# Patient Record
Sex: MALE | Race: BLACK OR AFRICAN AMERICAN | URBAN - METROPOLITAN AREA
[De-identification: names, ages, dates, MRNs, and addresses within clinical notes are randomized per-mention and may not be internally consistent; named-entity substitution may affect disease eponyms.]

---

## 2022-09-09 ENCOUNTER — OFFICE VISIT (OUTPATIENT)
Dept: PRIMARY CARE CLINIC | Age: 22
End: 2022-09-09

## 2022-09-09 VITALS
DIASTOLIC BLOOD PRESSURE: 70 MMHG | BODY MASS INDEX: 20.41 KG/M2 | HEART RATE: 101 BPM | SYSTOLIC BLOOD PRESSURE: 116 MMHG | HEIGHT: 66 IN | WEIGHT: 127 LBS

## 2022-09-09 DIAGNOSIS — R63.0 DECREASED APPETITE: ICD-10-CM

## 2022-09-09 DIAGNOSIS — Z00.00 HEALTHCARE MAINTENANCE: ICD-10-CM

## 2022-09-09 DIAGNOSIS — Z13.31 POSITIVE DEPRESSION SCREENING: ICD-10-CM

## 2022-09-09 DIAGNOSIS — G47.00 INSOMNIA, UNSPECIFIED TYPE: ICD-10-CM

## 2022-09-09 DIAGNOSIS — F41.9 ANXIETY: ICD-10-CM

## 2022-09-09 DIAGNOSIS — Z00.00 PHYSICAL EXAM: ICD-10-CM

## 2022-09-09 DIAGNOSIS — Z00.00 PHYSICAL EXAM: Primary | ICD-10-CM

## 2022-09-09 DIAGNOSIS — F12.90 MARIJUANA USER: ICD-10-CM

## 2022-09-09 PROCEDURE — 99395 PREV VISIT EST AGE 18-39: CPT | Performed by: FAMILY MEDICINE

## 2022-09-09 ASSESSMENT — ANXIETY QUESTIONNAIRES
3. WORRYING TOO MUCH ABOUT DIFFERENT THINGS: 1
1. FEELING NERVOUS, ANXIOUS, OR ON EDGE: 1
GAD7 TOTAL SCORE: 7
5. BEING SO RESTLESS THAT IT IS HARD TO SIT STILL: 1
2. NOT BEING ABLE TO STOP OR CONTROL WORRYING: 1
4. TROUBLE RELAXING: 1
7. FEELING AFRAID AS IF SOMETHING AWFUL MIGHT HAPPEN: 1
6. BECOMING EASILY ANNOYED OR IRRITABLE: 1

## 2022-09-09 ASSESSMENT — PATIENT HEALTH QUESTIONNAIRE - PHQ9
1. LITTLE INTEREST OR PLEASURE IN DOING THINGS: 3
7. TROUBLE CONCENTRATING ON THINGS, SUCH AS READING THE NEWSPAPER OR WATCHING TELEVISION: 3
4. FEELING TIRED OR HAVING LITTLE ENERGY: 2
SUM OF ALL RESPONSES TO PHQ QUESTIONS 1-9: 14
5. POOR APPETITE OR OVEREATING: 2
8. MOVING OR SPEAKING SO SLOWLY THAT OTHER PEOPLE COULD HAVE NOTICED. OR THE OPPOSITE, BEING SO FIGETY OR RESTLESS THAT YOU HAVE BEEN MOVING AROUND A LOT MORE THAN USUAL: 0
6. FEELING BAD ABOUT YOURSELF - OR THAT YOU ARE A FAILURE OR HAVE LET YOURSELF OR YOUR FAMILY DOWN: 1
SUM OF ALL RESPONSES TO PHQ QUESTIONS 1-9: 14
SUM OF ALL RESPONSES TO PHQ QUESTIONS 1-9: 14
2. FEELING DOWN, DEPRESSED OR HOPELESS: 0
9. THOUGHTS THAT YOU WOULD BE BETTER OFF DEAD, OR OF HURTING YOURSELF: 0
SUM OF ALL RESPONSES TO PHQ QUESTIONS 1-9: 14
SUM OF ALL RESPONSES TO PHQ9 QUESTIONS 1 & 2: 3
3. TROUBLE FALLING OR STAYING ASLEEP: 3

## 2022-09-09 ASSESSMENT — ENCOUNTER SYMPTOMS
SHORTNESS OF BREATH: 0
ABDOMINAL PAIN: 0
WHEEZING: 0
EYE REDNESS: 0
COUGH: 0
DIARRHEA: 0
CHEST TIGHTNESS: 0

## 2022-09-09 NOTE — PROGRESS NOTES
Chief Complaint   Patient presents with    Establish Care         ASSESSMENT/PLAN:  1. Physical exam  VS reviewed     BMI reviewed   All questions answered. F/u discussed. Appropriate healthy lifestyle modifications discussed. - CBC with Auto Differential; Future  - Comprehensive Metabolic Panel; Future    2. Healthcare maintenance  Update  - HIV Screen; Future  - Hepatitis C Antibody; Future    3. Anxiety  Seems to be anxious regarding the health of his father, his living situation and being 25 not having a job or any money and no car  Would like to talk to somebody  Rule out thyroid abnormality  - Ambulatory referral to Psychology  - TSH with Reflex; Future    4. Positive depression screening  PHQ-9 score today: (PHQ-9 Total Score: 14), additional evaluation and assessment performed, follow-up plan includes but not limited to: Medication management and Referral to /Specialist  for evaluation and management. 5. Marijuana user  Daily, helps with his anxiety but seems to also keep him awake at night and cause some paranoia  States that this is helped him get through [facility] not trying things and stay levelheaded  The room smells very strongly of marijuana    6. Decreased appetite  Rule out thyroid abnormality  Patient does not have any money and states that sometimes he just does not eat  - TSH with Reflex; Future    7. Insomnia, unspecified type  Difficult for him to fall asleep due to anxiety and his mind will not shut down  He will try to smoke pot to help sleep but sometimes does not work  Rule out thyroid abnormality  - TSH with Reflex; Future     I have spent 20 minutes of face to face time for education and counseling with the patient, as well as chart review, for insomnia, marijuana use and risk vs benefit, anxiety and stress. HPI:  Sanjuanita Billy is a 25 y.o. (: 2000) here today for physical exam.     Moved form [facility]. Living with cousin. No transport or job. No licence or ID. He is difficult to follow but alert and oriented    Has difficulty sleeping  Has difficulty maintaining appetite due to stress he states  Is very anxious about his father's health who is in the hospital and living with his cousin without a job. He does not have a car or any independence  Spent all of his money on a bus ticket to [facility] from [facility] [address]. Would like to update blood work    Review of Systems   Constitutional:  Positive for fatigue. Negative for activity change, chills and fever. HENT:  Negative for congestion. Eyes:  Negative for redness. Respiratory:  Negative for cough, chest tightness, shortness of breath and wheezing. Cardiovascular:  Negative for chest pain and palpitations. Gastrointestinal:  Negative for abdominal pain and diarrhea. Genitourinary:  Negative for difficulty urinating. Musculoskeletal:  Negative for arthralgias. Skin:  Negative for rash. Allergic/Immunologic: Negative for immunocompromised state. Neurological:  Negative for dizziness, light-headedness and headaches. Hematological:  Negative for adenopathy. Psychiatric/Behavioral:  Positive for agitation, behavioral problems, decreased concentration and sleep disturbance. Negative for self-injury and suicidal ideas. The patient is nervous/anxious. Past Medical History:   Diagnosis Date    ADHD        History reviewed. No pertinent family history. Social History     Tobacco Use    Smoking status: Every Day     Types: Cigarettes, Cigars    Smokeless tobacco: Never   Substance Use Topics    Alcohol use: Yes    Drug use: Yes     Frequency: 7.0 times per week     Types: Marijuana Delona Members)       New Prescriptions    No medications on file       Meds Prior to visit:  No current outpatient medications on file prior to visit. No current facility-administered medications on file prior to visit.      No Known Allergies    OBJECTIVE:  /70   Pulse (!) 101   Ht 5' 6\" (1.676 m)   Wt 127 lb (57.6 kg)   BMI 20.50 kg/m²   BP Readings from Last 2 Encounters:   09/09/22 116/70     Wt Readings from Last 3 Encounters:   09/09/22 127 lb (57.6 kg)       Physical Exam  Vitals reviewed. Constitutional:       General: He is not in acute distress. Appearance: Normal appearance. He is well-developed. He is not ill-appearing. HENT:      Head: Normocephalic and atraumatic. Right Ear: Tympanic membrane, ear canal and external ear normal. There is no impacted cerumen. Left Ear: Ear canal and external ear normal. There is no impacted cerumen. Nose: Nose normal. No congestion or rhinorrhea. Mouth/Throat:      Mouth: Mucous membranes are moist.      Pharynx: Oropharynx is clear. No oropharyngeal exudate or posterior oropharyngeal erythema. Eyes:      General: No scleral icterus. Right eye: No discharge. Left eye: No discharge. Extraocular Movements: Extraocular movements intact. Conjunctiva/sclera: Conjunctivae normal.      Pupils: Pupils are equal, round, and reactive to light. Cardiovascular:      Rate and Rhythm: Normal rate and regular rhythm. Pulses: Normal pulses. Heart sounds: Normal heart sounds. No murmur heard. Comments: Normal radial and pedal pulses  Pulmonary:      Effort: Pulmonary effort is normal. No respiratory distress. Breath sounds: Normal breath sounds. No wheezing. Chest:      Chest wall: No tenderness. Abdominal:      General: Bowel sounds are normal. There is no distension. Palpations: Abdomen is soft. There is no mass. Tenderness: There is no abdominal tenderness. Comments: Normal liver and spleen, no organomegaly. Musculoskeletal:         General: No tenderness or deformity. Normal range of motion. Cervical back: Normal range of motion and neck supple. Right lower leg: No edema. Left lower leg: No edema.       Comments: Range of motion intact in all extremities   Lymphadenopathy: Cervical: No cervical adenopathy. Skin:     General: Skin is warm and dry. Capillary Refill: Capillary refill takes less than 2 seconds. Findings: No erythema or rash. Neurological:      Mental Status: He is alert and oriented to person, place, and time. Sensory: No sensory deficit. Motor: No abnormal muscle tone. Coordination: Coordination normal.   Psychiatric:         Behavior: Behavior normal.         Thought Content: Thought content normal.         Judgment: Judgment normal.      Comments:           Discussed use, benefit, and side effects of prescribed medications. Barriers to medication compliance addressed. All patient questions answered. Pt voiced understanding. RTC No follow-ups on file. No future appointments.       Darin Isaac MD  9/9/2022  11:21 AM

## 2022-09-10 LAB
A/G RATIO: 2.2 (ref 1.1–2.2)
ALBUMIN SERPL-MCNC: 4.6 G/DL (ref 3.4–5)
ALP BLD-CCNC: 62 U/L (ref 40–129)
ALT SERPL-CCNC: 14 U/L (ref 10–40)
ANION GAP SERPL CALCULATED.3IONS-SCNC: 12 MMOL/L (ref 3–16)
AST SERPL-CCNC: 15 U/L (ref 15–37)
BASOPHILS ABSOLUTE: 0 K/UL (ref 0–0.2)
BASOPHILS RELATIVE PERCENT: 0.4 %
BILIRUB SERPL-MCNC: 0.3 MG/DL (ref 0–1)
BUN BLDV-MCNC: 21 MG/DL (ref 7–20)
CALCIUM SERPL-MCNC: 9.1 MG/DL (ref 8.3–10.6)
CHLORIDE BLD-SCNC: 103 MMOL/L (ref 99–110)
CO2: 23 MMOL/L (ref 21–32)
CREAT SERPL-MCNC: 0.8 MG/DL (ref 0.9–1.3)
EOSINOPHILS ABSOLUTE: 0.1 K/UL (ref 0–0.6)
EOSINOPHILS RELATIVE PERCENT: 0.7 %
GFR AFRICAN AMERICAN: >60
GFR NON-AFRICAN AMERICAN: >60
GLUCOSE BLD-MCNC: 108 MG/DL (ref 70–99)
HCT VFR BLD CALC: 43.1 % (ref 40.5–52.5)
HEMOGLOBIN: 14.6 G/DL (ref 13.5–17.5)
HEPATITIS C ANTIBODY INTERPRETATION: NORMAL
HIV AG/AB: NORMAL
HIV ANTIGEN: NORMAL
HIV-1 ANTIBODY: NORMAL
HIV-2 AB: NORMAL
LYMPHOCYTES ABSOLUTE: 1.7 K/UL (ref 1–5.1)
LYMPHOCYTES RELATIVE PERCENT: 22.2 %
MCH RBC QN AUTO: 32 PG (ref 26–34)
MCHC RBC AUTO-ENTMCNC: 34 G/DL (ref 31–36)
MCV RBC AUTO: 94.2 FL (ref 80–100)
MONOCYTES ABSOLUTE: 0.5 K/UL (ref 0–1.3)
MONOCYTES RELATIVE PERCENT: 5.8 %
NEUTROPHILS ABSOLUTE: 5.6 K/UL (ref 1.7–7.7)
NEUTROPHILS RELATIVE PERCENT: 70.9 %
PDW BLD-RTO: 13.6 % (ref 12.4–15.4)
PLATELET # BLD: 167 K/UL (ref 135–450)
PMV BLD AUTO: 8.5 FL (ref 5–10.5)
POTASSIUM SERPL-SCNC: 4.5 MMOL/L (ref 3.5–5.1)
RBC # BLD: 4.58 M/UL (ref 4.2–5.9)
SODIUM BLD-SCNC: 138 MMOL/L (ref 136–145)
TOTAL PROTEIN: 6.7 G/DL (ref 6.4–8.2)
TSH REFLEX: 0.28 UIU/ML (ref 0.27–4.2)
WBC # BLD: 7.9 K/UL (ref 4–11)

## 2022-11-29 ENCOUNTER — HOSPITAL ENCOUNTER (EMERGENCY)
Age: 22
Discharge: HOME | End: 2022-11-29
Attending: EMERGENCY MEDICINE

## 2022-11-29 ENCOUNTER — APPOINTMENT (OUTPATIENT)
Dept: GENERAL RADIOLOGY | Age: 22
End: 2022-11-29

## 2022-11-29 ENCOUNTER — APPOINTMENT (OUTPATIENT)
Dept: CT IMAGING | Age: 22
End: 2022-11-29

## 2022-11-29 VITALS
TEMPERATURE: 97.7 F | OXYGEN SATURATION: 99 % | RESPIRATION RATE: 16 BRPM | DIASTOLIC BLOOD PRESSURE: 75 MMHG | HEART RATE: 86 BPM | WEIGHT: 125 LBS | HEIGHT: 66 IN | SYSTOLIC BLOOD PRESSURE: 121 MMHG | BODY MASS INDEX: 20.09 KG/M2

## 2022-11-29 DIAGNOSIS — J10.1 INFLUENZA A: ICD-10-CM

## 2022-11-29 DIAGNOSIS — J18.9 ATYPICAL PNEUMONIA: ICD-10-CM

## 2022-11-29 LAB
A/G RATIO: 1.4 (ref 1.1–2.2)
ALBUMIN SERPL-MCNC: 4.2 G/DL (ref 3.4–5)
ALP BLD-CCNC: 60 U/L (ref 40–129)
ALT SERPL-CCNC: 21 U/L (ref 10–40)
ANION GAP SERPL CALCULATED.3IONS-SCNC: 12 MMOL/L (ref 3–16)
AST SERPL-CCNC: 25 U/L (ref 15–37)
BASOPHILS ABSOLUTE: 0 K/UL (ref 0–0.2)
BASOPHILS RELATIVE PERCENT: 0.5 %
BILIRUB SERPL-MCNC: 0.3 MG/DL (ref 0–1)
BILIRUBIN URINE: NEGATIVE
BLOOD, URINE: NEGATIVE
BUN BLDV-MCNC: 14 MG/DL (ref 7–20)
CALCIUM SERPL-MCNC: 9 MG/DL (ref 8.3–10.6)
CHLORIDE BLD-SCNC: 97 MMOL/L (ref 99–110)
CLARITY: CLEAR
CO2: 22 MMOL/L (ref 21–32)
COLOR: YELLOW
CREAT SERPL-MCNC: 0.6 MG/DL (ref 0.9–1.3)
EKG ATRIAL RATE: 78 BPM
EKG DIAGNOSIS: NORMAL
EKG P AXIS: 39 DEGREES
EKG P-R INTERVAL: 132 MS
EKG Q-T INTERVAL: 374 MS
EKG QRS DURATION: 80 MS
EKG QTC CALCULATION (BAZETT): 426 MS
EKG R AXIS: -77 DEGREES
EKG T AXIS: 2 DEGREES
EKG VENTRICULAR RATE: 78 BPM
EOSINOPHILS ABSOLUTE: 0 K/UL (ref 0–0.6)
EOSINOPHILS RELATIVE PERCENT: 0 %
EPITHELIAL CELLS, UA: ABNORMAL /HPF (ref 0–5)
GFR SERPL CREATININE-BSD FRML MDRD: >60 ML/MIN/{1.73_M2}
GLUCOSE BLD-MCNC: 107 MG/DL (ref 70–99)
GLUCOSE URINE: NEGATIVE MG/DL
HCT VFR BLD CALC: 44.3 % (ref 40.5–52.5)
HEMOGLOBIN: 15.3 G/DL (ref 13.5–17.5)
KETONES, URINE: NEGATIVE MG/DL
LEUKOCYTE ESTERASE, URINE: NEGATIVE
LIPASE: 16 U/L (ref 13–60)
LYMPHOCYTES ABSOLUTE: 1.1 K/UL (ref 1–5.1)
LYMPHOCYTES RELATIVE PERCENT: 14.4 %
MCH RBC QN AUTO: 31.5 PG (ref 26–34)
MCHC RBC AUTO-ENTMCNC: 34.4 G/DL (ref 31–36)
MCV RBC AUTO: 91.4 FL (ref 80–100)
MICROSCOPIC EXAMINATION: YES
MONOCYTES ABSOLUTE: 0.8 K/UL (ref 0–1.3)
MONOCYTES RELATIVE PERCENT: 10.5 %
MUCUS: ABNORMAL /LPF
NEUTROPHILS ABSOLUTE: 5.5 K/UL (ref 1.7–7.7)
NEUTROPHILS RELATIVE PERCENT: 74.6 %
NITRITE, URINE: NEGATIVE
PDW BLD-RTO: 14 % (ref 12.4–15.4)
PH UA: 7 (ref 5–8)
PLATELET # BLD: 139 K/UL (ref 135–450)
PMV BLD AUTO: 8.5 FL (ref 5–10.5)
POTASSIUM REFLEX MAGNESIUM: 3.8 MMOL/L (ref 3.5–5.1)
PRO-BNP: 18 PG/ML (ref 0–124)
PROTEIN UA: ABNORMAL MG/DL
RAPID INFLUENZA  B AGN: NEGATIVE
RAPID INFLUENZA A AGN: POSITIVE
RBC # BLD: 4.85 M/UL (ref 4.2–5.9)
RBC UA: ABNORMAL /HPF (ref 0–4)
SARS-COV-2, NAAT: NOT DETECTED
SODIUM BLD-SCNC: 131 MMOL/L (ref 136–145)
SPECIFIC GRAVITY UA: <=1.005 (ref 1–1.03)
TOTAL PROTEIN: 7.1 G/DL (ref 6.4–8.2)
TROPONIN: <0.01 NG/ML
URINE REFLEX TO CULTURE: ABNORMAL
URINE TYPE: ABNORMAL
UROBILINOGEN, URINE: 0.2 E.U./DL
WBC # BLD: 7.4 K/UL (ref 4–11)
WBC UA: ABNORMAL /HPF (ref 0–5)

## 2022-11-29 PROCEDURE — 87804 INFLUENZA ASSAY W/OPTIC: CPT

## 2022-11-29 PROCEDURE — 83690 ASSAY OF LIPASE: CPT

## 2022-11-29 PROCEDURE — 85025 COMPLETE CBC W/AUTO DIFF WBC: CPT

## 2022-11-29 PROCEDURE — 81001 URINALYSIS AUTO W/SCOPE: CPT

## 2022-11-29 PROCEDURE — 87635 SARS-COV-2 COVID-19 AMP PRB: CPT

## 2022-11-29 PROCEDURE — 74177 CT ABD & PELVIS W/CONTRAST: CPT

## 2022-11-29 PROCEDURE — 71045 X-RAY EXAM CHEST 1 VIEW: CPT

## 2022-11-29 PROCEDURE — 6360000002 HC RX W HCPCS: Performed by: EMERGENCY MEDICINE

## 2022-11-29 PROCEDURE — 99285 EMERGENCY DEPT VISIT HI MDM: CPT

## 2022-11-29 PROCEDURE — 93005 ELECTROCARDIOGRAM TRACING: CPT | Performed by: EMERGENCY MEDICINE

## 2022-11-29 PROCEDURE — 84484 ASSAY OF TROPONIN QUANT: CPT

## 2022-11-29 PROCEDURE — 6360000004 HC RX CONTRAST MEDICATION: Performed by: EMERGENCY MEDICINE

## 2022-11-29 PROCEDURE — 2580000003 HC RX 258: Performed by: EMERGENCY MEDICINE

## 2022-11-29 PROCEDURE — 80053 COMPREHEN METABOLIC PANEL: CPT

## 2022-11-29 PROCEDURE — 83880 ASSAY OF NATRIURETIC PEPTIDE: CPT

## 2022-11-29 RX ORDER — 0.9 % SODIUM CHLORIDE 0.9 %
1000 INTRAVENOUS SOLUTION INTRAVENOUS ONCE
Status: COMPLETED | OUTPATIENT
Start: 2022-11-29 | End: 2022-11-29

## 2022-11-29 RX ORDER — KETOROLAC TROMETHAMINE 30 MG/ML
30 INJECTION, SOLUTION INTRAMUSCULAR; INTRAVENOUS ONCE
Status: COMPLETED | OUTPATIENT
Start: 2022-11-29 | End: 2022-11-29

## 2022-11-29 RX ORDER — ONDANSETRON 2 MG/ML
4 INJECTION INTRAMUSCULAR; INTRAVENOUS
Status: DISCONTINUED | OUTPATIENT
Start: 2022-11-29 | End: 2022-11-29 | Stop reason: HOSPADM

## 2022-11-29 RX ORDER — BENZONATATE 100 MG/1
100 CAPSULE ORAL 2 TIMES DAILY PRN
Qty: 20 CAPSULE | Refills: 0 | Status: SHIPPED | OUTPATIENT
Start: 2022-11-29 | End: 2022-12-06

## 2022-11-29 RX ORDER — AZITHROMYCIN 250 MG/1
TABLET, FILM COATED ORAL
Qty: 1 PACKET | Refills: 0 | Status: SHIPPED | OUTPATIENT
Start: 2022-11-29

## 2022-11-29 RX ORDER — DEXTROMETHORPHAN HYDROBROMIDE AND PROMETHAZINE HYDROCHLORIDE 15; 6.25 MG/5ML; MG/5ML
5 SYRUP ORAL 4 TIMES DAILY PRN
Qty: 100 ML | Refills: 0 | Status: SHIPPED | OUTPATIENT
Start: 2022-11-29 | End: 2022-12-06

## 2022-11-29 RX ORDER — IBUPROFEN 200 MG
600 TABLET ORAL EVERY 8 HOURS PRN
Qty: 60 TABLET | Refills: 0 | Status: SHIPPED | OUTPATIENT
Start: 2022-11-29

## 2022-11-29 RX ADMIN — KETOROLAC TROMETHAMINE 30 MG: 30 INJECTION, SOLUTION INTRAMUSCULAR at 11:36

## 2022-11-29 RX ADMIN — SODIUM CHLORIDE 1000 ML: 9 INJECTION, SOLUTION INTRAVENOUS at 11:36

## 2022-11-29 RX ADMIN — IOHEXOL 100 ML: 350 INJECTION, SOLUTION INTRAVENOUS at 12:51

## 2022-11-29 ASSESSMENT — PAIN SCALES - GENERAL: PAINLEVEL_OUTOF10: 8

## 2022-11-29 ASSESSMENT — PAIN - FUNCTIONAL ASSESSMENT: PAIN_FUNCTIONAL_ASSESSMENT: 0-10

## 2022-11-29 NOTE — ED PROVIDER NOTES
The [address] [place] [address] Emergency Department    Sandeep Rose MD, am the primary clinician of record. CHIEF COMPLAINT  Chief Complaint   Patient presents with    Influenza      HISTORY OF PRESENT ILLNESS  Lester Diaz is a 25 y.o. male  who presents to the ED complaining of a few days of cough/cold symptoms. Arrives by EMS and temp is 100.7 on arrival.  Feels restless because of coughing and body aches. Reports feeling generally weak. Reports diffuse upper abdominal pains as well. +nausea, no vomiting/diarrhea. No jaundice. No sick contacts he is aware of. Feels SOB. Describes some chest pains as wel. Onset of sx on Thursday (Thanksgiving). No other complaints, modifying factors or associated symptoms. I have reviewed the following from the nursing documentation. Past Medical History:   Diagnosis Date    ADHD      History reviewed. No pertinent surgical history. History reviewed. No pertinent family history.   Social History     Socioeconomic History    Marital status: Single     Spouse name: Not on file    Number of children: Not on file    Years of education: Not on file    Highest education level: Not on file   Occupational History    Not on file   Tobacco Use    Smoking status: Every Day     Packs/day: 0.50     Types: Cigarettes, Cigars    Smokeless tobacco: Never   Substance and Sexual Activity    Alcohol use: Yes     Comment: occasionally    Drug use: Yes     Frequency: 7.0 times per week     Types: Marijuana Seabron Barban)    Sexual activity: Not on file   Other Topics Concern    Not on file   Social History Narrative    Not on file     Social Determinants of Health     Financial Resource Strain: Not on file   Food Insecurity: Not on file   Transportation Needs: Not on file   Physical Activity: Not on file   Stress: Not on file   Social Connections: Not on file   Intimate Partner Violence: Not on file   Housing Stability: Not on file     Current Facility-Administered Medications Medication Dose Route Frequency Provider Last Rate Last Admin    ondansetron (ZOFRAN) injection 4 mg  4 mg IntraVENous Q1H PRN Gerhard Mejia MD         Current Outpatient Medications   Medication Sig Dispense Refill    azithromycin (ZITHROMAX) 250 MG tablet Take 500mg (two tablets) on the first day by mouth. Then take 250mg (one tablet) by mouth daily for 4 more days. Complete this medication regimen even if you feel better before it is done. 1 packet 0    benzonatate (TESSALON) 100 MG capsule Take 1 capsule by mouth 2 times daily as needed for Cough 20 capsule 0    promethazine-dextromethorphan (PROMETHAZINE-DM) 6.25-15 MG/5ML syrup Take 5 mLs by mouth 4 times daily as needed for Cough 100 mL 0    ibuprofen (ADVIL) 200 MG tablet Take 3 tablets by mouth every 8 hours as needed for Pain 60 tablet 0     No Known Allergies    REVIEW OF SYSTEMS  10 systems reviewed, pertinent positives per HPI otherwise noted to be negative. PHYSICAL EXAM  /75   Pulse 86   Temp 97.7 °F (36.5 °C) (Oral)   Resp 16   Ht 5' 6\" (1.676 m)   Wt 125 lb (56.7 kg)   SpO2 99%   BMI 20.18 kg/m²    GENERAL APPEARANCE: Awake and alert. Cooperative. No distress. HENT: Normocephalic. Atraumatic. Mucous membranes are dry. NECK: Supple. EYES: PERRL. EOM's grossly intact. HEART/CHEST: RRR. No murmurs. No chest wall tenderness. LUNGS: Respirations unlabored. CTAB. Good air exchange. Speaking comfortably in full sentences. ABDOMEN: Mild LUQ epig and RUQ ttp without peritonitis, perez's sign, or lower abd tenderness. Soft. Non-distended. No masses. No organomegaly. No guarding or rebound. Normal bowel sounds throughout. MUSCULOSKELETAL: No extremity edema. Compartments soft. No deformity. No tenderness in the extremities. All extremities neurovascularly intact. SKIN: Warm and dry. No acute rashes. NEUROLOGICAL: Alert and oriented. CN's 2-12 intact. No gross facial drooping. Strength 5/5, sensation intact.  2 plus DTR's in knees bilaterally. Gait normal.  PSYCHIATRIC: Normal mood and affect. LABS  I have reviewed all labs for this visit.    Results for orders placed or performed during the hospital encounter of 11/29/22   Rapid Flu Swab    Specimen: Nasopharyngeal   Result Value Ref Range    Rapid Influenza A Ag POSITIVE (A) Negative    Rapid Influenza B Ag Negative Negative   COVID-19, Rapid    Specimen: Nasopharyngeal Swab   Result Value Ref Range    SARS-CoV-2, NAAT Not Detected Not Detected   CBC with Auto Differential   Result Value Ref Range    WBC 7.4 4.0 - 11.0 K/uL    RBC 4.85 4.20 - 5.90 M/uL    Hemoglobin 15.3 13.5 - 17.5 g/dL    Hematocrit 44.3 40.5 - 52.5 %    MCV 91.4 80.0 - 100.0 fL    MCH 31.5 26.0 - 34.0 pg    MCHC 34.4 31.0 - 36.0 g/dL    RDW 14.0 12.4 - 15.4 %    Platelets 348 471 - 029 K/uL    MPV 8.5 5.0 - 10.5 fL    Neutrophils % 74.6 %    Lymphocytes % 14.4 %    Monocytes % 10.5 %    Eosinophils % 0.0 %    Basophils % 0.5 %    Neutrophils Absolute 5.5 1.7 - 7.7 K/uL    Lymphocytes Absolute 1.1 1.0 - 5.1 K/uL    Monocytes Absolute 0.8 0.0 - 1.3 K/uL    Eosinophils Absolute 0.0 0.0 - 0.6 K/uL    Basophils Absolute 0.0 0.0 - 0.2 K/uL   Comprehensive Metabolic Panel w/ Reflex to MG   Result Value Ref Range    Sodium 131 (L) 136 - 145 mmol/L    Potassium reflex Magnesium 3.8 3.5 - 5.1 mmol/L    Chloride 97 (L) 99 - 110 mmol/L    CO2 22 21 - 32 mmol/L    Anion Gap 12 3 - 16    Glucose 107 (H) 70 - 99 mg/dL    BUN 14 7 - 20 mg/dL    Creatinine 0.6 (L) 0.9 - 1.3 mg/dL    Est, Glom Filt Rate >60 >60    Calcium 9.0 8.3 - 10.6 mg/dL    Total Protein 7.1 6.4 - 8.2 g/dL    Albumin 4.2 3.4 - 5.0 g/dL    Albumin/Globulin Ratio 1.4 1.1 - 2.2    Total Bilirubin 0.3 0.0 - 1.0 mg/dL    Alkaline Phosphatase 60 40 - 129 U/L    ALT 21 10 - 40 U/L    AST 25 15 - 37 U/L   Troponin   Result Value Ref Range    Troponin <0.01 <0.01 ng/mL   Brain Natriuretic Peptide   Result Value Ref Range    Pro-BNP 18 0 - 124 pg/mL   Lipase Result Value Ref Range    Lipase 16.0 13.0 - 60.0 U/L   EKG 12 Lead   Result Value Ref Range    Ventricular Rate 78 BPM    Atrial Rate 78 BPM    P-R Interval 132 ms    QRS Duration 80 ms    Q-T Interval 374 ms    QTc Calculation (Bazett) 426 ms    P Axis 39 degrees    R Axis -77 degrees    T Axis 2 degrees    Diagnosis       Sinus rhythm with occasional Premature ventricular complexesLeft axis deviationRSR' or QR pattern in V1 suggests right ventricular conduction delayPossible Inferior infarct , age undeterminedAbnormal ECG       The 12 lead EKG was interpreted by me independent of a cardiologist as follows:  Rate: normal with a rate of 78  Rhythm: sinus with PVC's  Axis: left deviation  Intervals: normal NH, narrow QRS, normal QTc, RSR' pattern  ST segments: no ST elevations or depressions  T waves: no abnormal inversions  Non-specific T wave changes: present  Prior EKG comparison: No prior is currently available for comparison      RADIOLOGY    CT ABDOMEN PELVIS W IV CONTRAST Additional Contrast? None    Result Date: 11/29/2022  CT ABDOMEN AND PELVIS WITH CONTRAST HISTORY: COMPARISON: None TECHNICAL FACTORS: Post contrast axial images were obtained through the abdomen and pelvis. 100 cc of Isovue 370 was administered. Underexposure controls were utilized during the CT examination to meet ALARA standards for radiation dose reduction. FINDINGS: Irregular density seen suggestive of focal airspace disease at the right lung base is seen. This is suggestive of atypical pneumonia. Evaluation of the abdomen and pelvis is very limited due to extreme possibility of intra-abdominal fat. Liver shows uniform attenuation with no focal mass lesion or biliary ductal dilation. Tura Kasey bladder shows no stones or significant wall thickening. Spleen is within normal limits in size. Pancreas shows no gross inflammatory process or fluid collection. Adrenal glands are within normal limits.  Kidneys enhance uniformly with no gross mass or hydronephrosis seen. No retroperitoneal adenopathy is seen. Bowel gas pattern is nonspecific with no free air seen. Appendix is not clearly seen but no gross pericecal inflammatory changes are seen. No significant osseous abnormality is identified. No free fluid is seen. No evidence of acute pelvic inflammatory process is seen. Focal area of airspace disease seen at right lung base suspicious for pneumonia. It evaluation of the abdomen and pelvis is very limited due to extreme possibility of intra-abdominal fat. There is otherwise no discrete evidence of acute abnormality identified. XR CHEST 1 VIEW    Result Date: 11/29/2022  History: Fever. Upper respiratory infection. PA view the chest. FINDINGS: Normal heart size. No effusion or consolidation. No acute osseous abnormality. Mediastinal contours are unremarkable. 1. No acute disease. ED COURSE/MDM  Patient seen and evaluated. Old records reviewed. Labs and imaging reviewed and results discussed with patient. The patient's ED workup was notable for viral syndrome symptoms, with URI symptoms and some abdominal discomfort as well. As such, chest x-ray was obtained which is clear and a CT abdomen and pelvis which was fairly unremarkable for intra-abdominal findings however did show a questionable pneumonia. This could be related to his influenza a positivity, but he is out of the influenza treatment window for Tamiflu and so will be managed conservatively. However given that focal infiltrate on CT abdomen and pelvis despite a clear chest x-ray, he will be covered for atypical pneumonia with a Z-Lucius. He is not septic or toxic appearing or hypoxic at all. He is ambulatory without distress at all. No leukocytosis. COVID-negative. Follow-up with PCP. Return to ED for any new or worsening symptoms in the interim. He defervesced and is feeling a lot better on reassessment.           During the patient's ED course, the patient was given: Medications   ondansetron (ZOFRAN) injection 4 mg (has no administration in time range)   0.9 % sodium chloride IV bolus 1,000 mL (0 mLs IntraVENous Stopped 11/29/22 1302)   ketorolac (TORADOL) injection 30 mg (30 mg IntraVENous Given 11/29/22 1136)   iohexol (OMNIPAQUE 350) solution 100 mL (100 mLs IntraVENous Given 11/29/22 1251)        CLINICAL IMPRESSION  1. Influenza A    2. Atypical pneumonia        Blood pressure 121/75, pulse 86, temperature 97.7 °F (36.5 °C), temperature source Oral, resp. rate 16, height 5' 6\" (1.676 m), weight 125 lb (56.7 kg), SpO2 99 %. Laxmi Hensley was discharged to home in stable condition. I have discussed the findings of today's workup with the patient and addressed the patient's questions and concerns. Important warning signs as well as new or worsening symptoms which would necessitate immediate return to the ED were discussed. The plan is to discharge from the ED at this time, and the patient is in stable condition. The patient acknowledged understanding is agreeable with this plan. Patient was given scripts for the following medications. I counseled patient how to take these medications. New Prescriptions    AZITHROMYCIN (ZITHROMAX) 250 MG TABLET    Take 500mg (two tablets) on the first day by mouth. Then take 250mg (one tablet) by mouth daily for 4 more days. Complete this medication regimen even if you feel better before it is done.     BENZONATATE (TESSALON) 100 MG CAPSULE    Take 1 capsule by mouth 2 times daily as needed for Cough    IBUPROFEN (ADVIL) 200 MG TABLET    Take 3 tablets by mouth every 8 hours as needed for Pain    PROMETHAZINE-DEXTROMETHORPHAN (PROMETHAZINE-DM) 6.25-15 MG/5ML SYRUP    Take 5 mLs by mouth 4 times daily as needed for Cough       Follow-up with:  Your PCP    Schedule an appointment as soon as possible for a visit in 1 week  For symptom re-evaluation    [address] Emergency Department  [address] [place] [address]  [phone]  Go to   If symptoms worsen    DISCLAIMER: This chart was created using Dragon dictation software. Efforts were made by me to ensure accuracy, however some errors may be present due to limitations of this technology and occasionally words are not transcribed correctly.         Bethany Lee MD  11/29/22 [id number]

## 2022-12-06 ENCOUNTER — HOSPITAL ENCOUNTER (EMERGENCY)
Age: 22
Discharge: HOME | End: 2022-12-06
Attending: EMERGENCY MEDICINE
Payer: MEDICAID

## 2022-12-06 VITALS
TEMPERATURE: 97 F | DIASTOLIC BLOOD PRESSURE: 81 MMHG | HEART RATE: 81 BPM | BODY MASS INDEX: 21.24 KG/M2 | SYSTOLIC BLOOD PRESSURE: 126 MMHG | OXYGEN SATURATION: 98 % | RESPIRATION RATE: 17 BRPM | WEIGHT: 131.61 LBS

## 2022-12-06 DIAGNOSIS — L03.113 CELLULITIS OF RIGHT FOREARM: Primary | ICD-10-CM

## 2022-12-06 DIAGNOSIS — G47.00 INSOMNIA, UNSPECIFIED TYPE: ICD-10-CM

## 2022-12-06 PROCEDURE — 99283 EMERGENCY DEPT VISIT LOW MDM: CPT

## 2022-12-06 PROCEDURE — 6370000000 HC RX 637 (ALT 250 FOR IP): Performed by: EMERGENCY MEDICINE

## 2022-12-06 RX ORDER — CEPHALEXIN 500 MG/1
500 CAPSULE ORAL 4 TIMES DAILY
Qty: 28 CAPSULE | Refills: 0 | Status: SHIPPED | OUTPATIENT
Start: 2022-12-06 | End: 2022-12-13

## 2022-12-06 RX ORDER — IBUPROFEN 600 MG/1
600 TABLET ORAL EVERY 6 HOURS PRN
Qty: 40 TABLET | Refills: 0 | Status: SHIPPED | OUTPATIENT
Start: 2022-12-06

## 2022-12-06 RX ORDER — HYDROXYZINE HYDROCHLORIDE 25 MG/1
25 TABLET, FILM COATED ORAL NIGHTLY PRN
Qty: 10 TABLET | Refills: 0 | Status: SHIPPED | OUTPATIENT
Start: 2022-12-06 | End: 2022-12-16

## 2022-12-06 RX ORDER — CEPHALEXIN 500 MG/1
500 CAPSULE ORAL ONCE
Status: COMPLETED | OUTPATIENT
Start: 2022-12-06 | End: 2022-12-06

## 2022-12-06 RX ADMIN — IBUPROFEN 600 MG: 200 TABLET, FILM COATED ORAL at 13:25

## 2022-12-06 RX ADMIN — CEPHALEXIN 500 MG: 500 CAPSULE ORAL at 13:25

## 2022-12-06 ASSESSMENT — PAIN DESCRIPTION - ORIENTATION
ORIENTATION: RIGHT
ORIENTATION: RIGHT

## 2022-12-06 ASSESSMENT — PAIN - FUNCTIONAL ASSESSMENT
PAIN_FUNCTIONAL_ASSESSMENT: 0-10
PAIN_FUNCTIONAL_ASSESSMENT: 0-10

## 2022-12-06 ASSESSMENT — PAIN SCALES - GENERAL
PAINLEVEL_OUTOF10: 8
PAINLEVEL_OUTOF10: 8
PAINLEVEL_OUTOF10: 7

## 2022-12-06 ASSESSMENT — PAIN DESCRIPTION - DESCRIPTORS: DESCRIPTORS: SHARP;BURNING

## 2022-12-06 ASSESSMENT — PAIN DESCRIPTION - LOCATION
LOCATION: ARM
LOCATION: ARM

## 2022-12-06 NOTE — ED NOTES
Patient complaining of increasing arm discomfort and weakness;     Dr. Tate Charles informed; no new orders at this time;      Frandy Dallas RN  12/06/22 4131

## 2022-12-06 NOTE — ED PROVIDER NOTES
CHIEF COMPLAINT  Arm Pain (Right AC, pt has had pain since receiving medication in IV 3 days ago, slight redness noted )      HISTORY OF PRESENT ILLNESS  Fern Navarro is a 25 y.o. male who  has a past medical history of ADHD. presents to the ED complaining of right arm pain near an IV insertion site that he had 3 days ago at [facility] [place] [facility]. Patient received Toradol via IV and states that after receiving the injection he had some pain in his forearm. States the pain is gotten worse over the past 2 days. Patient denies any numbness or weakness. States the pain is an aching sensation that gets worse with movement and better with resting. Patient states \"I need you to cut off my arm and give me a  \". Denies any falls or trauma. No numbness or weakness. Patient also states is been having trouble sleeping over the past week and a half. Patient states he occasionally smokes marijuana to help him sleep at night but has not smoked over the past few days. Patient initially denies taking any over-the-counter medication for sleep aid. Did discuss melatonin and patient states that he has been taking melatonin over the past 3 days despite initially denying taking any medications. Denies any alcohol use. Denies any other illicit drug use except for marijuana. Denies taking any over-the-counter medication for pain control. No other complaints, modifying factors or associated symptoms. Pt was seen during the [place] 19 pandemic. Appropriate PPE worn by ME during patient encounters. Patient was cared for during a time with constrained hospital bed capacity with nationwide stress on resources and staffing. Nursing notes reviewed. Past Medical History:   Diagnosis Date    ADHD      History reviewed. No pertinent surgical history. History reviewed. No pertinent family history.   Social History     Socioeconomic History    Marital status: Single     Spouse name: Not on file    Number of children: Not on file Years of education: Not on file    Highest education level: Not on file   Occupational History    Not on file   Tobacco Use    Smoking status: Every Day     Packs/day: 0.50     Types: Cigarettes, Cigars    Smokeless tobacco: Never   Substance and Sexual Activity    Alcohol use: Yes     Comment: occasionally    Drug use: Yes     Frequency: 7.0 times per week     Types: Marijuana [place])    Sexual activity: Not on file   Other Topics Concern    Not on file   Social History Narrative    Not on file     Social Determinants of Health     Financial Resource Strain: Not on file   Food Insecurity: Not on file   Transportation Needs: Not on file   Physical Activity: Not on file   Stress: Not on file   Social Connections: Not on file   Intimate Partner Violence: Not on file   Housing Stability: Not on file     No current facility-administered medications for this encounter. Current Outpatient Medications   Medication Sig Dispense Refill    cephALEXin (KEFLEX) 500 MG capsule Take 1 capsule by mouth 4 times daily for 7 days 28 capsule 0    ibuprofen (ADVIL;MOTRIN) 600 MG tablet Take 1 tablet by mouth every 6 hours as needed for Pain 40 tablet 0    hydrOXYzine HCl (ATARAX) 25 MG tablet Take 1 tablet by mouth nightly as needed (sleep) 10 tablet 0    azithromycin (ZITHROMAX) 250 MG tablet Take 500mg (two tablets) on the first day by mouth. Then take 250mg (one tablet) by mouth daily for 4 more days. Complete this medication regimen even if you feel better before it is done.  1 packet 0    benzonatate (TESSALON) 100 MG capsule Take 1 capsule by mouth 2 times daily as needed for Cough 20 capsule 0    promethazine-dextromethorphan (PROMETHAZINE-DM) 6.25-15 MG/5ML syrup Take 5 mLs by mouth 4 times daily as needed for Cough 100 mL 0     No Known Allergies      REVIEW OF SYSTEMS  (up to 7 for level 4, 8 or more for level 5) pertinent positives per HPI otherwise noted to be negative    PHYSICAL EXAM  /81   Pulse 81   Temp 97 sleep health. Return instruction provided. All questions answered prior to discharge. Is this patient to be included in the SEP-1 Core Measure due to severe sepsis or septic shock? No   Exclusion criteria - the patient is NOT to be included for SEP-1 Core Measure due to:  2+ SIRS criteria are not met    No results found for this visit on 12/06/22. I estimate there is LOW risk for COMPARTMENT SYNDROME, NECROTIZING FASCIITIS, TENDON OR NEUROVASCULAR INJURY, or FOREIGN BODY, thus I consider the discharge disposition reasonable. Also, there is no evidence or peritonitis, sepsis, or toxicity. Ravi Quinones and I have discussed the diagnosis and risks, and we agree with discharging home to follow-up with their primary doctor. We also discussed returning to the Emergency Department immediately if new or worsening symptoms occur. We have discussed the symptoms which are most concerning (e.g., changing or worsening pain, fever, numbness, weakness, cool or painful digits) that necessitate immediate return. Patient was given scripts for the following medications. I counseled patient how to take these medications. New Prescriptions    CEPHALEXIN (KEFLEX) 500 MG CAPSULE    Take 1 capsule by mouth 4 times daily for 7 days    HYDROXYZINE HCL (ATARAX) 25 MG TABLET    Take 1 tablet by mouth nightly as needed (sleep)    IBUPROFEN (ADVIL;MOTRIN) 600 MG TABLET    Take 1 tablet by mouth every 6 hours as needed for Pain           CLINICAL IMPRESSION  1. Cellulitis of right forearm    2. Insomnia, unspecified type        Blood pressure 126/81, pulse 81, temperature 97 °F (36.1 °C), temperature source Oral, resp. rate 17, weight 131 lb 9.8 oz (59.7 kg), SpO2 98 %. DISPOSITION  Patient was discharged to home in good condition. [facility]  [phone]  Call   For a follow up appointment with a primary care physician. Disclaimer: All medical record entries made by [employer] dictation.       (Please note that this note was completed with a voice recognition program. Every attempt was made to edit the dictations, but inevitably there remain words that are mis-transcribed.)            Vera Faulkner MD  12/06/22 [address], [place]  12/06/22 9421

## 2022-12-06 NOTE — DISCHARGE INSTRUCTIONS
Call today or tomorrow to follow up with primary care physician referral from the emergency room in 3-4 days for recheck. Take your medication as indicated, if you are given an antibiotic then make sure you get the prescription filled and take the antibiotics until finished. Drink plenty of water while taking the antibiotics. Avoid drinking alcohol while taking antibiotics. Zayra Cummins has some antibiotics for free; Felice Chan has a 4 dollar prescription plan for some antibiotics. Use ibuprofen or Tylenol (unless prescribed medications that have Tylenol in it) for pain. You can take over the counter Ibuprofen (advil) tablets (4 tablets every 8 hours or 3 tablets every 6 hours or 2 tablets every 4 hours)    Return to the Emergency Department for worsening swelling, increase in redness to the area, notice any drainage, develop fever > 101.5, any other care or concern.

## 2022-12-06 NOTE — ED NOTES
During discharge patient freely moved his right arm without difficulty noted by this rn     Kelliezak Alves RN  12/06/22 2353